# Patient Record
Sex: FEMALE | Race: WHITE
[De-identification: names, ages, dates, MRNs, and addresses within clinical notes are randomized per-mention and may not be internally consistent; named-entity substitution may affect disease eponyms.]

---

## 2020-04-23 ENCOUNTER — HOSPITAL ENCOUNTER (EMERGENCY)
Dept: HOSPITAL 38 - CC.ED | Age: 70
Discharge: NURSING, REHAB OR LONG-TERM CARE | End: 2020-04-23
Payer: MEDICARE

## 2020-04-23 VITALS — HEART RATE: 64 BPM | DIASTOLIC BLOOD PRESSURE: 76 MMHG | SYSTOLIC BLOOD PRESSURE: 157 MMHG

## 2020-04-23 DIAGNOSIS — E78.00: ICD-10-CM

## 2020-04-23 DIAGNOSIS — Z88.8: ICD-10-CM

## 2020-04-23 DIAGNOSIS — I63.12: Primary | ICD-10-CM

## 2020-04-23 DIAGNOSIS — Z79.899: ICD-10-CM

## 2020-04-23 DIAGNOSIS — I10: ICD-10-CM

## 2020-04-23 LAB
APTT PPP: 27.6 SEC (ref 23.2–32.3)
CHLORIDE SERPL-SCNC: 103 MEQ/L (ref 98–106)
SODIUM SERPL-SCNC: 137 MEQ/L (ref 136–145)
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
## 2020-09-20 ENCOUNTER — HOSPITAL ENCOUNTER (EMERGENCY)
Dept: HOSPITAL 38 - CC.ED | Age: 70
Discharge: HOME | End: 2020-09-20
Payer: MEDICARE

## 2020-09-20 VITALS — SYSTOLIC BLOOD PRESSURE: 136 MMHG | HEART RATE: 71 BPM | DIASTOLIC BLOOD PRESSURE: 70 MMHG

## 2020-09-20 DIAGNOSIS — Z79.899: ICD-10-CM

## 2020-09-20 DIAGNOSIS — Z88.8: ICD-10-CM

## 2020-09-20 DIAGNOSIS — I25.2: ICD-10-CM

## 2020-09-20 DIAGNOSIS — I10: ICD-10-CM

## 2020-09-20 DIAGNOSIS — S60.221A: Primary | ICD-10-CM

## 2020-09-20 DIAGNOSIS — E78.00: ICD-10-CM

## 2020-09-20 DIAGNOSIS — Z95.5: ICD-10-CM

## 2020-09-20 DIAGNOSIS — M19.90: ICD-10-CM

## 2020-09-20 DIAGNOSIS — Z86.73: ICD-10-CM

## 2020-09-20 DIAGNOSIS — F17.210: ICD-10-CM
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
## 2020-09-20 LAB — APTT PPP: 26.5 SEC (ref 23.2–32.3)

## 2020-09-20 NOTE — EDM.PDOC
ED HPI GENERAL MEDICAL PROBLEM





- General


Chief Complaint: Upper Extremity Injury/Pain


Stated Complaint: R hand pain


Time Seen by Provider: 09/20/20 11:48


Source of Information: Reports: Patient


History Limitations: Reports: No Limitations





- History of Present Illness


INITIAL COMMENTS - FREE TEXT/NARRATIVE: 





Patient to the emergency department today complaining of pain with swelling and 

bruising to the dorsal part of the right hand, the patient advised when she went

to bed everything was fine when she woke up she had the pain and bruising.  She 

denies any numbness or tingling she denies any wrist pain she denies any finger 

pain she denies any other symptoms


Onset: Today


Location: Reports: Upper Extremity, Right


Quality: Reports: Ache


Severity: Mild


Improves with: Reports: None


Worsens with: Reports: Movement


Associated Symptoms: Reports: No Other Symptoms


Treatments PTA: Reports: Other (see below) (none)


  ** Right Hand


Pain Score (Numeric/FACES): 3





- Related Data


                                    Allergies











Allergy/AdvReac Type Severity Reaction Status Date / Time


 


clopidogrel [From Plavix] Allergy  Muscle Verified 09/20/20 11:36





   Aches  


 


lisinopril Allergy  Swollen Verified 09/20/20 11:37





   Tongue  


 


statins Allergy  Hives Uncoded 09/20/20 11:36











Home Meds: 


                                    Home Meds





Nitroglycerin [Nitrostat] 0.4 mg SL ASDIRECTED PRN 04/23/20 [History]


amLODIPine [Norvasc] 10 mg PO DAILY 04/23/20 [History]


carvediloL [Carvedilol] 6.25 mg PO BID 04/23/20 [History]


gemfibroziL [Gemfibrozil] 600 mg PO BID 04/23/20 [History]


Glucosam/Chond/Collagen/Hyalur [Glucosamine Chondroitin] 1 cap PO DAILY 09/20/20

[History]











Past Medical History


Cardiovascular History: Reports: High Cholesterol, Hypertension, MI, Stents, 

Other (See Below)


Other Cardiovascular History: angiogram X2


Musculoskeletal History: Reports: Arthritis, Back Pain, Chronic


Neurological History: Reports: CVA





- Infectious Disease History


Infectious Disease History: Reports: None





- Past Surgical History


GI Surgical History: Reports: Colon, Hernia, Abdominal, Other (See Below)





Social & Family History





- Tobacco Use


Smoking Status *Q: Current Some Day Smoker


Years of Tobacco use: 50


Packs/Tins Daily: 1





- Caffeine Use


Caffeine Use: Reports: Coffee, Soda





- Recreational Drug Use


Recreational Drug Use: No





- Living Situation & Occupation


Living situation: Reports: Single, Alone


Occupation: Retired





Review of Systems





- Review of Systems


Review Of Systems: See Below


Constitutional: Reports: No Symptoms


Respiratory: Reports: No Symptoms.  Denies: Shortness of Breath


Cardiovascular: Reports: No Symptoms.  Denies: Chest Pain


GI/Abdominal: Reports: No Symptoms


Musculoskeletal: Reports: Hand Pain.  Denies: Neck Pain, Back Pain


Skin: Reports: Bruising


Neurological: Reports: No Symptoms.  Denies: Dizziness, Headache, Numbness, 

Tingling, Weakness


Psychiatric: Reports: No Symptoms





ED EXAM, GENERAL





- Physical Exam


Exam: See Below


Exam Limited By: No Limitations


General Appearance: Alert, WD/WN, No Apparent Distress


Head: Atraumatic, Normocephalic


Respiratory/Chest: No Respiratory Distress, Lungs Clear, Normal Breath Sounds, 

Chest Non-Tender


Cardiovascular: Normal Peripheral Pulses, Regular Rate, Rhythm, No Murmur


Peripheral Pulses: 2+: Radial (L), Radial (R)


Back Exam: Normal Inspection, Full Range of Motion


Extremities: Normal Range of Motion, Normal Capillary Refill, Other (Pain 

swelling and bruising to the dorsal aspect of the right hand)


Neurological: Alert, Oriented, Normal Cognition, Normal Gait, No Motor/Sensory 

Deficits


Psychiatric: Normal Affect


Skin Exam: Warm, Dry, Intact, Normal Color, Ecchymosis (Dorsal part of the right

 hand)





Course





- Vital Signs


Text/Narrative:: 





1202 the patient was evaluated in the emergency department x-rays of the right 

hand has been obtained and does not show any acute fracture dislocation.  The 

bones do appear to be severely osteoporotic. lab is currently in sampling the 

patient's blood for PT, INR, PTT.  Once everything has been completed and 

evaluated disposition will be made.


1226 the patient's PT, INR, PTT are back and are normal.  It appears as if the 

patient does have a contusion to this hand.  The patient will be advised to 

follow [facility] protocol.  She is advised to follow-up with her PCP in the emergency

 department as needed.  The patient is to alternate Tylenol and or Motrin as 

needed for any pain.  The patient was advised to follow-up the PCP for further 

evaluation treatment possible osteoporosis which she advises that she did not 

know that she had.


Last Recorded V/S: 


                                Last Vital Signs











Temp  36.8 C   09/20/20 11:31


 


Pulse  71   09/20/20 11:31


 


Resp  18   09/20/20 11:31


 


BP  136/70   09/20/20 11:31


 


Pulse Ox  96   09/20/20 11:31














- Orders/Labs/Meds


Orders: 


                               Active Orders 24 hr











 Category Date Time Status


 


 Hand Comp Min 3V Rt [CR] Stat Exams  09/20/20 11:51 Ordered











Labs: 


                                Laboratory Tests











  09/20/20 Range/Units





  12:10 


 


PT  9.5 L  (9.7-12.3)  SEC


 


INR  0.94  (0.92-1.18)  


 


APTT  26.5  (23.2-32.3)  SEC














Departure





- Departure


Time of Disposition: 12:27


Disposition: Home, Self-Care 01


Condition: Good


Clinical Impression: 


 Contusion of right hand, initial encounter








- Discharge Information


*PRESCRIPTION DRUG MONITORING PROGRAM REVIEWED*: Not Applicable


*COPY OF PRESCRIPTION DRUG MONITORING REPORT IN PATIENT CORINNE: Not Applicable


Instructions:  Contusion, Easy-to-Read


Forms:  ED Department Discharge, ED Return to Work/School Form


Additional Instructions: 


Elevate right hand on 2 pillows


Ice off-and-on frequently for 2 days


Tylenol and or Motrin as needed for pain


Return to the emergency department as needed





Sepsis Event Note (ED)





- Evaluation


Sepsis Screening Result: No Definite Risk





- Focused Exam


Vital Signs: 


                                   Vital Signs











  Temp Pulse Resp BP Pulse Ox


 


 09/20/20 11:31  36.8 C  71  18  136/70  96














- Problem List & Annotations


(1) Contusion of right hand, initial encounter


SNOMED Code(s): 2893434


   Code(s): S60.221A - CONTUSION OF RIGHT HAND, INITIAL ENCOUNTER   Status: 

Acute   Priority: Low   Current Visit: No   





- Problem List Review


Problem List Initiated/Reviewed/Updated: Yes





- My Orders


Last 24 Hours: 


My Active Orders





09/20/20 11:51


Hand Comp Min 3V Rt [CR] Stat 














- Assessment/Plan


Last 24 Hours: 


My Active Orders





09/20/20 11:51


Hand Comp Min 3V Rt [CR] Stat 











Plan: 





The patient's past medical history, past surgical history, social history, and 

past family medical history is reviewed see the nursing notes for details

## 2020-09-26 ENCOUNTER — HOSPITAL ENCOUNTER (EMERGENCY)
Dept: HOSPITAL 38 - CC.ED | Age: 70
Discharge: HOME | End: 2020-09-26
Payer: MEDICARE

## 2020-09-26 VITALS — DIASTOLIC BLOOD PRESSURE: 82 MMHG | SYSTOLIC BLOOD PRESSURE: 136 MMHG | HEART RATE: 60 BPM

## 2020-09-26 DIAGNOSIS — T80.89XA: Primary | ICD-10-CM

## 2020-09-26 DIAGNOSIS — Z95.5: ICD-10-CM

## 2020-09-26 DIAGNOSIS — I10: ICD-10-CM

## 2020-09-26 DIAGNOSIS — Z86.73: ICD-10-CM

## 2020-09-26 DIAGNOSIS — I25.2: ICD-10-CM

## 2020-09-26 DIAGNOSIS — E78.00: ICD-10-CM

## 2020-09-26 DIAGNOSIS — Z79.899: ICD-10-CM

## 2020-09-26 DIAGNOSIS — Z88.8: ICD-10-CM

## 2020-09-26 DIAGNOSIS — Z79.82: ICD-10-CM

## 2020-09-26 LAB — APTT PPP: 28.5 SEC (ref 23.2–32.3)

## 2020-09-26 NOTE — EDM.PDOC
ED HPI GENERAL MEDICAL PROBLEM





- General


Chief Complaint: General


Stated Complaint: bleeding


Time Seen by Provider: 09/26/20 17:55


Source of Information: Reports: Patient


History Limitations: Reports: No Limitations





- History of Present Illness


INITIAL COMMENTS - FREE TEXT/NARRATIVE: 





This patient is a 70 year old female that presents to the ER. Patient reports 

she was at [facility] for fluid overload and carotid stent, admitted Tuesday.

She reports that she was discharged from there this afternoon. She reports that 

before leaving at 1pm she got a lovenox injection right abdomen. She reports it 

was bleeding after being given there, nurse changed the bandaid that was soaked 

with blood to a 2x2, per patient. Patient reports she just got home about an 

hour ago and noticed it was still bleeding. Patient reports coming here for 

this. Patient denies dizziness, ha, nausea, vomiting, blood in stool, blood 

vomiting, bruising at the injection site. Patient arrives and the site is 

scantly oozing blood.


Onset: Today


Onset Date: 09/26/20


Onset Time: 13:00


Location: Reports: Abdomen


Severity: Mild


Improves with: Reports: None


Worsens with: Reports: None


Associated Symptoms: Reports: No Other Symptoms





- Related Data


                                    Allergies











Allergy/AdvReac Type Severity Reaction Status Date / Time


 


clopidogrel [From Plavix] Allergy  Muscle Verified 09/26/20 17:19





   Aches  


 


lisinopril Allergy  Swollen Verified 09/26/20 17:19





   Tongue  


 


statins Allergy  Hives Uncoded 09/26/20 17:19











Home Meds: 


                                    Home Meds





Nitroglycerin [Nitrostat] 0.4 mg SL ASDIRECTED PRN 04/23/20 [History]


amLODIPine [Norvasc] 10 mg PO DAILY 04/23/20 [History]


carvediloL [Carvedilol] 6.25 mg PO BID 04/23/20 [History]


gemfibroziL [Gemfibrozil] 600 mg PO BID 04/23/20 [History]


Glucosam/Chond/Collagen/Hyalur [Glucosamine Chondroitin] 2 cap PO DAILY 09/20/20

[History]


Aspirin [Aspirin EC] 1 tab PO DAILY 09/26/20 [History]


Ezetimibe 1 tab PO DAILY 09/26/20 [History]


Isosorbide Mononitrate [Imdur] 1 tab PO DAILY 09/26/20 [History]











Past Medical History


HEENT History: Reports: Impaired Vision


Cardiovascular History: Reports: High Cholesterol, Hypertension, MI, Stents, 

Other (See Below)


Other Cardiovascular History: angiogram X2


Musculoskeletal History: Reports: Arthritis, Back Pain, Chronic


Neurological History: Reports: CVA





- Infectious Disease History


Infectious Disease History: Reports: None





- Past Surgical History


HEENT Surgical History: Reports: None


Cardiovascular Surgical History: Reports: Coronary Artery Stent


GI Surgical History: Reports: Colon, Hernia, Abdominal, Other (See Below)


Musculoskeletal Surgical History: Reports: None





Social & Family History





- Family History


Family Medical History: Noncontributory





- Tobacco Use


Smoking Status *Q: Never Smoker


Second Hand Smoke Exposure: No





- Caffeine Use


Caffeine Use: Reports: None





- Recreational Drug Use


Recreational Drug Use: No





- Living Situation & Occupation


Living situation: Reports: Single, Alone


Occupation: Retired





ED ROS GENERAL





- Review of Systems


Review Of Systems: See Below


Constitutional: Reports: No Symptoms


HEENT: Reports: No Symptoms


Respiratory: Reports: No Symptoms


Cardiovascular: Reports: No Symptoms


GI/Abdominal: Reports: No Symptoms


: Reports: No Symptoms


Skin: Reports: Wound (injection site abdomen bleeding)


Neurological: Reports: No Symptoms


Psychiatric: Reports: No Symptoms


Hematologic/Lymphatic: Reports: No Symptoms


Immunologic: Reports: No Symptoms





ED EXAM, GENERAL





- Physical Exam


Exam: See Below


Exam Limited By: No Limitations


General Appearance: Alert, WD/WN, No Apparent Distress


Neck: Normal Inspection


Respiratory/Chest: No Respiratory Distress, Lungs Clear, Normal Breath Sounds, 

No Accessory Muscle Use


Cardiovascular: Normal Peripheral Pulses, Regular Rate, Rhythm, No Edema


Peripheral Pulses: 2+: Radial (L), Radial (R), Posterior Tibial (L), Posterior 

Tibial (R)


GI/Abdominal: Soft, Non-Tender


Neurological: Alert, Oriented


Psychiatric: Normal Affect, Normal Mood


Skin Exam: Ecchymosis (right hand, left forearm, left upper arm. old. From 

needle sticks and IVs per patient.), Mottled (injection site oozing blood right 

lower abdomen. )





Course





- Vital Signs


Last Recorded V/S: 


                                Last Vital Signs











Temp  98.4 F   09/26/20 17:30


 


Pulse  60   09/26/20 17:30


 


Resp  18   09/26/20 17:30


 


BP  136/82   09/26/20 17:30


 


Pulse Ox  97   09/26/20 17:30














- Orders/Labs/Meds


Labs: 


                                Laboratory Tests











  09/26/20 09/26/20 Range/Units





  18:00 18:00 


 


WBC  7.9   (5.0-10.0)  10^3/uL


 


RBC  3.92 L   (4.00-5.50)  10^6/uL


 


Hgb  12.0   (12.0-16.0)  g/dL


 


Hct  36.2 L   (37.0-47.0)  %


 


MCV  92.3   (82.0-94.0)  fL


 


MCH  30.6   (27.0-32.0)  pg


 


MCHC  33.1   (33.0-38.0)  g/dL


 


RDW Coeff of Katherine  13.5   (11.0-15.0)  %


 


Plt Count  321   (150-400)  10^3/uL


 


Neut % (Auto)  59.8   (35-85)  %


 


Lymph % (Auto)  22.1   (10-55)  %


 


Mono % (Auto)  10.6   (0-16)  %


 


Eos % (Auto)  6.6 H   (0-5)  %


 


Baso % (Auto)  0.9   (0-3)  %


 


Neut # (Auto)  4.75   (1.80-7.00)  10^3/uL


 


Lymph # (Auto)  1.75   (1.00-4.80)  10^3/uL


 


Mono # (Auto)  0.84 H   (0.00-0.80)  10^3/uL


 


Eos # (Auto)  0.52 H   (0.00-0.45)  10^3/uL


 


Baso # (Auto)  0.07   10^3/uL


 


PT   10.2  (9.7-12.3)  SEC


 


INR   1.01  (0.92-1.18)  


 


APTT   28.5  (23.2-32.3)  SEC














- Re-Assessments/Exams


Free Text/Narrative Re-Assessment/Exam: 





09/26/20 18:14


Bleeding controlled and completely stopped with direct pressure to the site for 

2 minutes. Patient is educated about continue pressure to the site. Ice or cold 

compresses to stop bleeding if bleeds again. Educated when to return. She voices

 understanding. Educated to return if bleeding returns and not controlled with 

educated pressure and cold. 





Departure





- Departure


Time of Disposition: 18:20


Disposition: Home, Self-Care 01


Condition: Good


Clinical Impression: 


Hemorrhage at injection site


Qualifiers:


 Encounter type: initial encounter Qualified Code(s): T80.89XA - Other 

complications following infusion, transfusion and therapeutic injection, initial

 encounter








- Discharge Information


*PRESCRIPTION DRUG MONITORING PROGRAM REVIEWED*: Not Applicable


*COPY OF PRESCRIPTION DRUG MONITORING REPORT IN PATIENT CORINNE: Not Applicable


Instructions:  Puncture Wound, Easy-to-Read


Forms:  ED Department Discharge


Additional Instructions: 


Followup with your primary care provider this wee for recheck: Call Monday for 

appointment


Return to the ER for worsening of condition or any emergent concerns: Such as bl

eeding not controlled. If bleeding restarts, apply direct pressure for 1 hour. 

May use cold compress or ice to the area to slow bleeding as well


Return for vomiting blood or passing blood in the stool








Sepsis Event Note (ED)





- Evaluation


Sepsis Screening Result: No Definite Risk





- Focused Exam


Vital Signs: 


                                   Vital Signs











  Temp Pulse Resp BP Pulse Ox


 


 09/26/20 17:30  98.4 F  60  18  136/82  97














- Assessment/Plan


Plan: 





PLEASE SEE RN NOTE FOR PFSH

## 2022-01-13 ENCOUNTER — HOSPITAL ENCOUNTER (EMERGENCY)
Dept: HOSPITAL 38 - CC.ED | Age: 72
Discharge: NURSING, REHAB OR LONG-TERM CARE | End: 2022-01-13
Payer: MEDICARE

## 2022-01-13 VITALS — DIASTOLIC BLOOD PRESSURE: 84 MMHG | HEART RATE: 52 BPM | SYSTOLIC BLOOD PRESSURE: 147 MMHG

## 2022-01-13 DIAGNOSIS — Z86.73: ICD-10-CM

## 2022-01-13 DIAGNOSIS — Z79.899: ICD-10-CM

## 2022-01-13 DIAGNOSIS — E78.00: ICD-10-CM

## 2022-01-13 DIAGNOSIS — Z79.82: ICD-10-CM

## 2022-01-13 DIAGNOSIS — Z88.8: ICD-10-CM

## 2022-01-13 DIAGNOSIS — R79.89: ICD-10-CM

## 2022-01-13 DIAGNOSIS — Z95.5: ICD-10-CM

## 2022-01-13 DIAGNOSIS — I25.2: ICD-10-CM

## 2022-01-13 DIAGNOSIS — I10: ICD-10-CM

## 2022-01-13 DIAGNOSIS — J96.01: Primary | ICD-10-CM

## 2022-01-13 DIAGNOSIS — Z20.822: ICD-10-CM

## 2022-01-13 LAB
BASE EXCESS BLDA CALC-SCNC: -9.4 MMOL/L (ref -2–3)
CHLORIDE SERPL-SCNC: 104 MEQ/L (ref 98–106)
HCO3 BLDA-SCNC: 22.3 MM/L (ref 22–26)
INHALED O2 MECHANISM DOSE: (no result)
PCO2 BLDA: 96 MM/HG0 (ref 35–45)
PO2 BLDA: 63 MM/HG (ref 80–100)
SAO2 % BLDA: 75 % (ref 95–98)
SODIUM SERPL-SCNC: 140 MEQ/L (ref 136–145)

## 2022-01-13 PROCEDURE — U0002 COVID-19 LAB TEST NON-CDC: HCPCS
